# Patient Record
Sex: FEMALE | Race: WHITE | NOT HISPANIC OR LATINO | Employment: FULL TIME | URBAN - METROPOLITAN AREA
[De-identification: names, ages, dates, MRNs, and addresses within clinical notes are randomized per-mention and may not be internally consistent; named-entity substitution may affect disease eponyms.]

---

## 2021-09-27 ENCOUNTER — TELEPHONE (OUTPATIENT)
Dept: OBGYN CLINIC | Facility: CLINIC | Age: 56
End: 2021-09-27

## 2021-09-29 NOTE — TELEPHONE ENCOUNTER
Dwain Pierre returned call stating the prior insert medication did not seem to work very well and she was noticing vaginal discharge with odor  She would like to try something else  Maybe something by mouth  Clif Allison out of office today, will forward message  Patient understands appt may be indicated to discuss alternate therapy  Will await Edith's recommendation

## 2021-09-29 NOTE — TELEPHONE ENCOUNTER
Has not been seen since in this system  Which is  about  6 mo  Would like a repeat pelvic   Please have pt schedule

## 2021-09-29 NOTE — TELEPHONE ENCOUNTER
L/m on patient v/m to call back to schedule pelvic appt with Liat Tyler for eval per Edith's recommendation  6month since last visit

## 2021-10-07 ENCOUNTER — OFFICE VISIT (OUTPATIENT)
Dept: OBGYN CLINIC | Facility: CLINIC | Age: 56
End: 2021-10-07
Payer: COMMERCIAL

## 2021-10-07 ENCOUNTER — VBI (OUTPATIENT)
Dept: ADMINISTRATIVE | Facility: OTHER | Age: 56
End: 2021-10-07

## 2021-10-07 VITALS
BODY MASS INDEX: 20.61 KG/M2 | DIASTOLIC BLOOD PRESSURE: 68 MMHG | HEIGHT: 62 IN | WEIGHT: 112 LBS | SYSTOLIC BLOOD PRESSURE: 102 MMHG

## 2021-10-07 DIAGNOSIS — N95.2 POSTMENOPAUSE ATROPHIC VAGINITIS: ICD-10-CM

## 2021-10-07 DIAGNOSIS — N90.9 VULVAR DISORDER: ICD-10-CM

## 2021-10-07 DIAGNOSIS — D21.9 FIBROID: Primary | ICD-10-CM

## 2021-10-07 DIAGNOSIS — R10.2 VULVAR PAIN: ICD-10-CM

## 2021-10-07 DIAGNOSIS — N94.10 DYSPAREUNIA DUE TO NON-PSYCHOGENIC CAUSE IN FEMALE: ICD-10-CM

## 2021-10-07 PROCEDURE — 99213 OFFICE O/P EST LOW 20 MIN: CPT | Performed by: OBSTETRICS & GYNECOLOGY

## 2021-10-07 RX ORDER — CALCIUM CARBONATE 500(1250)
TABLET ORAL EVERY 12 HOURS
COMMUNITY

## 2021-10-07 RX ORDER — CLOBETASOL PROPIONATE 0.5 MG/G
CREAM TOPICAL
Qty: 60 G | Refills: 1 | Status: SHIPPED | OUTPATIENT
Start: 2021-10-07

## 2021-10-07 RX ORDER — OMEGA-3 FATTY ACIDS/FISH OIL 300-1000MG
CAPSULE ORAL AS NEEDED
COMMUNITY

## 2021-10-07 RX ORDER — GLUCOSAMINE SULFATE 500 MG
CAPSULE ORAL 3 TIMES DAILY
COMMUNITY
End: 2022-03-15

## 2021-10-07 RX ORDER — CLOBETASOL PROPIONATE 0.5 MG/G
CREAM TOPICAL 2 TIMES DAILY
Qty: 60 G | Refills: 1 | Status: SHIPPED | OUTPATIENT
Start: 2021-10-07 | End: 2021-10-07

## 2021-10-12 ENCOUNTER — TELEPHONE (OUTPATIENT)
Dept: OBGYN CLINIC | Facility: CLINIC | Age: 56
End: 2021-10-12

## 2021-10-18 ENCOUNTER — TELEPHONE (OUTPATIENT)
Dept: OBGYN CLINIC | Facility: CLINIC | Age: 56
End: 2021-10-18

## 2021-11-29 ENCOUNTER — OFFICE VISIT (OUTPATIENT)
Dept: OBGYN CLINIC | Facility: CLINIC | Age: 56
End: 2021-11-29
Payer: COMMERCIAL

## 2021-11-29 VITALS
HEIGHT: 62 IN | DIASTOLIC BLOOD PRESSURE: 70 MMHG | SYSTOLIC BLOOD PRESSURE: 110 MMHG | BODY MASS INDEX: 2.02 KG/M2 | WEIGHT: 11 LBS

## 2021-11-29 DIAGNOSIS — N95.2 POSTMENOPAUSE ATROPHIC VAGINITIS: Primary | ICD-10-CM

## 2021-11-29 DIAGNOSIS — N90.9 VULVAR DISORDER: ICD-10-CM

## 2021-11-29 DIAGNOSIS — N76.2 ATROPHIC VULVITIS: ICD-10-CM

## 2021-11-29 DIAGNOSIS — R10.2 VULVAR PAIN: ICD-10-CM

## 2021-11-29 DIAGNOSIS — N94.10 DYSPAREUNIA DUE TO NON-PSYCHOGENIC CAUSE IN FEMALE: ICD-10-CM

## 2021-11-29 PROCEDURE — 99213 OFFICE O/P EST LOW 20 MIN: CPT | Performed by: OBSTETRICS & GYNECOLOGY

## 2021-11-29 RX ORDER — ESCITALOPRAM OXALATE 5 MG/1
TABLET ORAL
COMMUNITY
Start: 2021-11-29 | End: 2022-03-15

## 2021-11-29 RX ORDER — ESTRADIOL 10 UG/1
INSERT VAGINAL
COMMUNITY
Start: 2021-11-23 | End: 2022-05-09

## 2022-01-03 ENCOUNTER — TELEPHONE (OUTPATIENT)
Dept: OBGYN CLINIC | Facility: CLINIC | Age: 57
End: 2022-01-03

## 2022-01-03 NOTE — TELEPHONE ENCOUNTER
Stacy leal/yarelis she has additional questions about lichen sclerosis  She has been researching and found there is an immuno suppressant ointment available and would like to ask popeye about this  In addition she would like her mammogam order  Per prior imaging due after April  Also due for well exam after 3/8/2022    Forwarded to popeye to review

## 2022-01-03 NOTE — TELEPHONE ENCOUNTER
Returned call to patient  Pt  researched and  found  another treatment  Not aware of other treatments  Can send information to   office  Will review  Mammogram due in 4/2022   Pt due for wellness in  3/2022 tos chedule

## 2022-01-12 ENCOUNTER — TELEPHONE (OUTPATIENT)
Dept: OBGYN CLINIC | Facility: CLINIC | Age: 57
End: 2022-01-12

## 2022-01-12 NOTE — TELEPHONE ENCOUNTER
Kathy leal/yarelis asking if the lichen sclerosus could be causing her neck to be red, dry and itchy  Return call to Aleena Boyce, advised lichen sclerosus affects the genital/anal area  Recommend contact PCP to evaluate redness/itchiness of skin at neck area  Patient asking about auto immune treatments other than what she is currently using  Reviewed previous phone encounter   She will further  discuss with popeye at next visit

## 2022-02-04 ENCOUNTER — TELEPHONE (OUTPATIENT)
Dept: OBGYN CLINIC | Facility: CLINIC | Age: 57
End: 2022-02-04

## 2022-02-04 NOTE — TELEPHONE ENCOUNTER
Pt called informing she has been diagnosed with lichen Sclerosus and has been researching alternative treatment recommendations  Pt is inquiring if Edith PRESSLEY has any information regarding treating LS with Tacrolimus oint  Pt is aware Edith PRESSLEY is currently out of the office, will forward to her to review  Pt became very emotional during conversation  Pt has scheduled a consult with nutritionist and a dermatologist for further treatment recommendations  Pt will address  treatment options with dermatology and  Tacrolimus oint use

## 2022-02-08 NOTE — TELEPHONE ENCOUNTER
Returned  Call to patient  She is using the steroid ointment    QOd now  Things are okay per pt   Questions  Answered  Fu 3/15

## 2022-03-01 ENCOUNTER — TELEPHONE (OUTPATIENT)
Dept: OBGYN CLINIC | Facility: CLINIC | Age: 57
End: 2022-03-01

## 2022-03-01 NOTE — TELEPHONE ENCOUNTER
Wilson called asking if she can increase clobetasol to every other day for flare up and how long she can use it that way  Also wondering if [facility] has heard of light therapy for treatment option?

## 2022-03-01 NOTE — TELEPHONE ENCOUNTER
No  to  light  therapy  Can check with a derm Can go to   twice a day for 2 weeks then   once a day  for  one  week  week  4 off

## 2022-03-15 ENCOUNTER — ANNUAL EXAM (OUTPATIENT)
Dept: OBGYN CLINIC | Facility: CLINIC | Age: 57
End: 2022-03-15
Payer: COMMERCIAL

## 2022-03-15 VITALS
BODY MASS INDEX: 20.69 KG/M2 | WEIGHT: 109.6 LBS | HEIGHT: 61 IN | SYSTOLIC BLOOD PRESSURE: 86 MMHG | DIASTOLIC BLOOD PRESSURE: 58 MMHG

## 2022-03-15 DIAGNOSIS — N90.9 VULVAR DISORDER: ICD-10-CM

## 2022-03-15 DIAGNOSIS — Z98.890 HISTORY OF LUMPECTOMY OF LEFT BREAST: Primary | ICD-10-CM

## 2022-03-15 DIAGNOSIS — F41.9 ANXIETY: ICD-10-CM

## 2022-03-15 DIAGNOSIS — Z01.411 ENCOUNTER FOR GYNECOLOGICAL EXAMINATION WITH ABNORMAL FINDING: ICD-10-CM

## 2022-03-15 DIAGNOSIS — N76.2 ATROPHIC VULVITIS: ICD-10-CM

## 2022-03-15 DIAGNOSIS — Z12.31 BREAST CANCER SCREENING BY MAMMOGRAM: ICD-10-CM

## 2022-03-15 DIAGNOSIS — D21.9 FIBROID: ICD-10-CM

## 2022-03-15 PROCEDURE — 99396 PREV VISIT EST AGE 40-64: CPT | Performed by: OBSTETRICS & GYNECOLOGY

## 2022-03-15 NOTE — PROGRESS NOTES
[address], [address], [facility], [address]    ASSESSMENT/PLAN: Arline Baumgarten is a 64 y o  Yamilet Sal who presents for annual gynecologic exam     Encounter for routine gynecologic examination  - Routine well woman exam completed today  - Cervical Cancer Screening: Current ASCCP Guidelines reviewed  Last Pap: 03/10/2021   Next Pap Due:   - COVID vaccine  Pfizer  X 3   Flu shot   Neg   - Contraceptive counseling discussed  Current contraception: none:   - Breast Cancer Screening: Last Mammogram 04/12/2021,   - Colorectal cancer screening  dw pt     - The following were reviewed in today's visit: breast self exam, mammography screening ordered, use and side effects of HRT, menopause, osteoporosis, adequate intake of calcium and vitamin D, exercise, healthy diet, DEXA ordered and colonoscopy discussed     Additional problems addressed during this visit:  1  History of lumpectomy of left breast  -     Mammo screening bilateral w 3d & cad; Future; Expected date: 03/14/2023    2  Breast cancer screening by mammogram  -     Mammo screening bilateral w 3d & cad; Future; Expected date: 03/14/2023    3  Vulvar disorder    4  Atrophic vulvitis    5  Fibroid    + vulvar pain and discomfort t  Feels like  knives into the perineum  Improvement w  Use  Of  Clobetasol  Using  Every other day  Once,  Aquaphor  Between  DW pt  Vulvar  Biopsy to r/o or support  Dx of  L/S  Option to see   [facility]    Who works  w  Dr Rajni Mejia given  Report any bleeding      CC: Annual Gynecologic Examination    HPI: Arline Baumgarten is a 64 y o  Yamilet Sal who presents for annual gynecologic examination  65 Yo here for   Wellness exam    + feels   Knife like  Pin in the  Vulvar area  + some  Decrease w use of   steroid  Ointment  No dc ,   Not sexually active  No vaginal odor    + wt loss  ,  Denies  Bleeding, bloating and satiety      The following portions of the patient's history were reviewed and updated as appropriate: She  has a past medical history of History of screening mammography (2021) and Papanicolaou smear (2021)  She  has a past surgical history that includes Mammo (historical) (2021); Cologuard (Historical);  section; Breast lumpectomy; and Breast biopsy (Left)  Her family history includes Dementia in her father; Leukemia in her daughter; Liver disease in her mother  She  reports that she has quit smoking  She has never used smokeless tobacco  She reports current alcohol use of about 5 0 - 8 0 standard drinks of alcohol per week  She reports that she does not use drugs  Current Outpatient Medications   Medication Sig Dispense Refill    Calcium 500 MG tablet Every 12 hours      clobetasol (TEMOVATE) 0 05 % cream PLEASE SEE ATTACHED FOR DETAILED DIRECTIONS 60 g 1    Ibuprofen (Advil) 200 MG CAPS as needed      Vagifem 10 MCG TABS vaginal tablet        No current facility-administered medications for this visit  She is allergic to penicillins       Review of Systems:  All systems normal except as noted in HPI          Objective:  BP (!) 86/58 (BP Location: Left arm, Patient Position: Sitting, Cuff Size: Standard)   Ht 5' 1 25" (1 556 m)   Wt 49 7 kg (109 lb 9 6 oz)   Breastfeeding No   BMI 20 54 kg/m²    Physical Exam  Vitals and nursing note reviewed  Constitutional:       Appearance: Normal appearance  HENT:      Head: Normocephalic  Cardiovascular:      Rate and Rhythm: Normal rate and regular rhythm  Pulses: Normal pulses  Heart sounds: Normal heart sounds  Pulmonary:      Effort: Pulmonary effort is normal       Breath sounds: Normal breath sounds  Chest:      Chest wall: No mass, lacerations, swelling, tenderness or edema  Breasts: Caden Score is 4   Breasts are symmetrical       Right: Normal  No swelling, bleeding, inverted nipple, mass, nipple discharge, skin change, tenderness, axillary adenopathy or supraclavicular adenopathy  Left: No swelling, bleeding, inverted nipple, mass, nipple discharge, skin change, tenderness, axillary adenopathy or supraclavicular adenopathy  Abdominal:      General: Abdomen is flat  Bowel sounds are normal       Palpations: Abdomen is soft  Hernia: There is no hernia in the left inguinal area or right inguinal area  Genitourinary:     General: Normal vulva  Exam position: Lithotomy position  Pubic Area: No rash or pubic lice  Caden stage (genital): 4       Labia:         Right: No rash, tenderness or lesion  Left: No rash, tenderness or lesion  Urethra: No prolapse, urethral pain, urethral swelling or urethral lesion  Vagina: Normal  No erythema  Cervix: Normal       Uterus: Normal        Adnexa: Right adnexa normal and left adnexa normal       Rectum: Normal           Comments: Vagina   +  Pink   No  Lesions     Musculoskeletal:         General: Normal range of motion  Cervical back: Neck supple  Lymphadenopathy:      Upper Body:      Right upper body: No supraclavicular, axillary or pectoral adenopathy  Left upper body: No supraclavicular, axillary or pectoral adenopathy  Lower Body: No right inguinal adenopathy  No left inguinal adenopathy  Skin:     General: Skin is warm and dry  Neurological:      General: No focal deficit present  Mental Status: She is alert and oriented to person, place, and time     Psychiatric:         Mood and Affect: Mood normal          Behavior: Behavior normal

## 2022-05-07 DIAGNOSIS — N95.2 POSTMENOPAUSAL ATROPHIC VAGINITIS: ICD-10-CM

## 2022-05-09 RX ORDER — ESTRADIOL 10 UG/1
INSERT VAGINAL
Qty: 24 TABLET | Refills: 4 | Status: SHIPPED | OUTPATIENT
Start: 2022-05-09

## 2022-10-28 ENCOUNTER — TELEPHONE (OUTPATIENT)
Dept: GASTROENTEROLOGY | Facility: CLINIC | Age: 57
End: 2022-10-28

## 2022-10-28 DIAGNOSIS — Z12.11 SCREENING FOR COLON CANCER: Primary | ICD-10-CM

## 2022-10-28 NOTE — TELEPHONE ENCOUNTER
10/28/22  Screened by: Charlie Nix    Referring Provider:Ashley Cullen     Pre- Screening: There is no height or weight on file to calculate BMI  Has patient been referred for a routine screening Colonoscopy? yes  Is the patient between 39-70 years old? yes      Previous Colonoscopy no   If yes:    Date:     Facility:     Reason:       SCHEDULING STAFF: If the patient is between 45yrs-49yrs, please advise patient to confirm benefits/coverage with their insurance company for a routine screening colonoscopy, some insurance carriers will only cover at [address] or older  If the patient is over 66years old, please schedule an office visit  Does the patient want to see a Gastroenterologist prior to their procedure OR are they having any GI symptoms? no    Has the patient been hospitalized or had abdominal surgery in the past 6 months? no    Does the patient use supplemental oxygen? no    Does the patient take Coumadin, Lovenox, Plavix, Elliquis, Xarelto, or other blood thinning medication? no    Has the patient had a stroke, cardiac event, or stent placed in the past year? no    SCHEDULING STAFF: If patient answers NO to above questions, then schedule procedure  If patient answers YES to above questions, then schedule office appointment  PT PASSED OA AND CAN BE REACHED AT [phone] [place]     If patient is between 45yrs - 49yrs, please advise patient that we will have to confirm benefits & coverage with their insurance company for a routine screening colonoscopy

## 2022-10-31 ENCOUNTER — PREP FOR PROCEDURE (OUTPATIENT)
Dept: GASTROENTEROLOGY | Facility: CLINIC | Age: 57
End: 2022-10-31

## 2022-10-31 DIAGNOSIS — Z12.11 SCREENING FOR COLON CANCER: Primary | ICD-10-CM

## 2022-10-31 NOTE — TELEPHONE ENCOUNTER
Scheduled date of colonoscopy (as of today): 12/12  Physician performing colonoscopy: Dr Arminda Simpson  [facility]  Bowel prep reviewed with patient: KAYLA BRUNSON [facility]  Instructions reviewed with patient by: Millie Benz  Clearances: none

## 2022-11-07 ENCOUNTER — TELEPHONE (OUTPATIENT)
Dept: GASTROENTEROLOGY | Facility: CLINIC | Age: 57
End: 2022-11-07

## 2022-11-07 NOTE — TELEPHONE ENCOUNTER
Patients GI provider:  Dr Marvin Herbert    Number to return call: ([phone])    Reason for call: Pt calling to reschedule her colonoscopy  Please call to reschedule  Thank you!     Scheduled procedure/appointment date if applicable: Apt/procedure 12/12/22

## 2022-12-27 DIAGNOSIS — Z12.11 SCREENING FOR COLON CANCER: ICD-10-CM

## 2022-12-29 RX ORDER — PEG-3350, SODIUM SULFATE, SODIUM CHLORIDE, POTASSIUM CHLORIDE, SODIUM ASCORBATE AND ASCORBIC ACID 7.5-2.691G
KIT ORAL
Refills: 0 | OUTPATIENT
Start: 2022-12-29

## 2023-01-03 ENCOUNTER — TELEPHONE (OUTPATIENT)
Dept: OTHER | Facility: OTHER | Age: 58
End: 2023-01-03

## 2023-01-03 NOTE — TELEPHONE ENCOUNTER
Patient called saying that she was told that she was going to get an e-mail with an attachment with the instruction for her colonoscopy prep, patient stated that she did the receive the e-mail but there was no attachment, patient is asking if the office can resent the attachment with the instruction or she can come in the office and  the instruction

## 2023-01-04 NOTE — TELEPHONE ENCOUNTER
Spoke to patient  A copy of Clenpiq prep instructions are available for  and also emailed to patient

## 2023-01-09 ENCOUNTER — ANESTHESIA EVENT (OUTPATIENT)
Dept: GASTROENTEROLOGY | Facility: AMBULATORY SURGERY CENTER | Age: 58
End: 2023-01-09

## 2023-01-09 ENCOUNTER — ANESTHESIA (OUTPATIENT)
Dept: GASTROENTEROLOGY | Facility: AMBULATORY SURGERY CENTER | Age: 58
End: 2023-01-09

## 2023-01-09 ENCOUNTER — HOSPITAL ENCOUNTER (OUTPATIENT)
Dept: GASTROENTEROLOGY | Facility: AMBULATORY SURGERY CENTER | Age: 58
Discharge: HOME | End: 2023-01-09
Attending: INTERNAL MEDICINE

## 2023-01-09 VITALS
BODY MASS INDEX: 20.58 KG/M2 | SYSTOLIC BLOOD PRESSURE: 105 MMHG | HEART RATE: 69 BPM | DIASTOLIC BLOOD PRESSURE: 68 MMHG | WEIGHT: 109 LBS | HEIGHT: 61 IN | TEMPERATURE: 97.7 F | RESPIRATION RATE: 22 BRPM | OXYGEN SATURATION: 97 %

## 2023-01-09 DIAGNOSIS — Z12.11 SCREENING FOR COLON CANCER: ICD-10-CM

## 2023-01-09 RX ORDER — SODIUM CHLORIDE, SODIUM LACTATE, POTASSIUM CHLORIDE, CALCIUM CHLORIDE 600; 310; 30; 20 MG/100ML; MG/100ML; MG/100ML; MG/100ML
50 INJECTION, SOLUTION INTRAVENOUS CONTINUOUS
Status: DISCONTINUED | OUTPATIENT
Start: 2023-01-09 | End: 2023-01-13 | Stop reason: HOSPADM

## 2023-01-09 RX ORDER — PROPOFOL 10 MG/ML
INJECTION, EMULSION INTRAVENOUS AS NEEDED
Status: DISCONTINUED | OUTPATIENT
Start: 2023-01-09 | End: 2023-01-09

## 2023-01-09 RX ADMIN — SODIUM CHLORIDE, SODIUM LACTATE, POTASSIUM CHLORIDE, CALCIUM CHLORIDE 50 ML/HR: 600; 310; 30; 20 INJECTION, SOLUTION INTRAVENOUS at 11:12

## 2023-01-09 RX ADMIN — PROPOFOL 50 MG: 10 INJECTION, EMULSION INTRAVENOUS at 11:34

## 2023-01-09 RX ADMIN — PROPOFOL 30 MG: 10 INJECTION, EMULSION INTRAVENOUS at 11:38

## 2023-01-09 RX ADMIN — PROPOFOL 50 MG: 10 INJECTION, EMULSION INTRAVENOUS at 11:27

## 2023-01-09 RX ADMIN — PROPOFOL 30 MG: 10 INJECTION, EMULSION INTRAVENOUS at 11:30

## 2023-01-09 RX ADMIN — PROPOFOL 70 MG: 10 INJECTION, EMULSION INTRAVENOUS at 11:25

## 2023-01-09 NOTE — ANESTHESIA POSTPROCEDURE EVALUATION
Post-Op Assessment Note    CV Status:  Stable       Mental Status:  Lethargic and sleepy   Hydration Status:  Stable   PONV Controlled:  None   Airway Patency:  Patent      Post Op Vitals Reviewed: Yes      Staff: Anesthesiologist, CRNA   Comments: vss        No notable events documented      BP   94/56   Temp      Pulse  66   Resp   14   SpO2   100

## 2023-01-09 NOTE — ANESTHESIA PREPROCEDURE EVALUATION
Procedure:  COLONOSCOPY    Relevant Problems   ANESTHESIA (within normal limits)      CARDIO (within normal limits)      PULMONARY (within normal limits)   (-) Sleep apnea   (-) Smoking (former)   (-) URI (upper respiratory infection)        Physical Exam    Airway    Mallampati score: I  TM Distance: >3 FB  Neck ROM: full     Dental   No notable dental hx     Cardiovascular      Pulmonary      Other Findings        Anesthesia Plan  ASA Score- 1     Anesthesia Type- IV sedation with anesthesia with ASA Monitors  Additional Monitors:   Airway Plan:           Plan Factors-Exercise tolerance (METS): >4 METS  Chart reviewed  Existing labs reviewed  Patient summary reviewed  Patient is not a current smoker  Induction- intravenous  Postoperative Plan-     Informed Consent- Anesthetic plan and risks discussed with patient  I personally reviewed this patient with the CRNA  Discussed and agreed on the Anesthesia Plan with the CRNA  Ling Magallon

## 2023-01-09 NOTE — H&P
History and Physical - [address] [facility]    Marla Syed 62 y o  female MRN: [id number]      HPI: Marla Syed is a 62y o  year old female who presents for colon cancer screening  Allergies   Allergen Reactions   • Penicillins Other (See Comments)     unknown         REVIEW OF SYSTEMS: Per the HPI, and otherwise unremarkable      Historical Information     Past Medical History:   Diagnosis Date   • History of screening mammography 2021    Bi-Rads 2    • Papanicolaou smear 2021     Past Surgical History:   Procedure Laterality Date   • BREAST BIOPSY Left    • BREAST LUMPECTOMY     •  SECTION      X2   • COLOGUARD (HISTORICAL)     • MAMMO (HISTORICAL)  2021    [facility]     Social History   Social History     Substance and Sexual Activity   Alcohol Use Yes   • Alcohol/week: 5 0 - 8 0 standard drinks   • Types: 5 - 8 Standard drinks or equivalent per week    Comment: wine nightly     Social History     Substance and Sexual Activity   Drug Use Never     Social History     Tobacco Use   Smoking Status Former   Smokeless Tobacco Never     Family History   Problem Relation Age of Onset   • Liver disease Mother    • Leukemia Daughter    • Dementia Father    • Breast cancer Neg Hx    • Uterine cancer Neg Hx    • Ovarian cancer Neg Hx    • Colon cancer Neg Hx        Meds/Allergies       Current Outpatient Medications:   •  Calcium 500 MG tablet  •  Ibuprofen 200 MG CAPS  •  sodium picosulfate, magnesium oxide, citric acid (Clenpiq) 10-3 5-12 MG-GM -GM/160ML SOLN  •  clobetasol (TEMOVATE) 0 05 % cream  •  polyethylene glycol (GOLYTELY) 4000 mL solution  •  Vagifem 10 MCG TABS vaginal tablet    Current Facility-Administered Medications:   •  lactated ringers infusion, 50 mL/hr, Intravenous, Continuous, 50 mL/hr at 23 1112        Objective     /53   Pulse 58   Temp 97 7 °F (36 5 °C) (Temporal)   Resp 16   Ht 5' 1 25" (1 556 m)   Wt 49 4 kg (109 lb)   SpO2 99% BMI 20 43 kg/m²       PHYSICAL EXAM    Gen: NAD AAOx3  Head: Normocephalic, Atraumatic  CV: S1S2 RRR no m/r/g  CHEST: Clear b/l no c/r/w  ABD: soft, +BS NT/ND no masses  EXT: no edema      ASSESSMENT/PLAN:  This is a 62y o  year old female here for colonoscopy, and she is stable and optimized for her procedure

## 2023-03-16 NOTE — PROGRESS NOTES
[address], [address], [facility] [address] [facility]    ASSESSMENT/PLAN: Yesi Berkowitz is a 62 y o  Gaviota Whitley who presents for annual gynecologic exam     Encounter for routine gynecologic examination  - Routine well woman exam completed today  - Cervical Cancer Screening: Current ASCCP Guidelines reviewed  Last Pap: 03/10/2021   Next Pap Due: 2024  - Breast Cancer Screening: Last Mammogram 04/14/2022, birads  1 50-75% d  - Colorectal cancer screening was not ordered  Done   2023   10 years    - The following were reviewed in today's visit: breast self exam, mammography screening ordered, STD testing, menopause, adequate intake of calcium and vitamin D, exercise, healthy diet and colonoscopy discussed     Additional problems addressed during this visit:  1  Encounter for gynecological examination with abnormal finding    2  Encounter for screening mammogram for malignant neoplasm of breast  -     Mammo screening bilateral w 3d & cad; Future    3  Postmenopausal atrophic vaginitis    4  Vulvar disorder  Comments:  pt seen by  Dr Amanda Ignacio   using   compounded  estrogen and  lidocaine  gel ,  Pt  and   vmagic ,  Hx of  vulvar inj    5  Anxiety    6  Fibroid    7  History of lumpectomy of left breast    8  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        CC:  Annual Gynecologic Examination    HPI: Yesi Berkowitz is a 62 y o  Gaviota Whitley who presents for annual gynecologic examination  63 yo her for wellness exam    Hx of  Vulvar  Pain  Seen  By  Dr Ke Shin w   Vestibulitis     No bleeding, bloating  And  Satiety  The following portions of the patient's history were reviewed and updated as appropriate: She  has a past medical history of History of screening mammography (04/12/2021), Papanicolaou smear (03/2021), Screening for colon cancer (01/09/2023), Vertigo (02/2023), and Vulvar pain    She  has a past surgical history that includes Mammo (historical) (03/2021); Cologuard (Historical);  section; Breast lumpectomy; and Breast biopsy (Left)  Her family history includes Dementia in her father; Leukemia in her daughter; Liver disease in her mother  She  reports that she has quit smoking  Her smoking use included cigarettes  She has never used smokeless tobacco  She reports current alcohol use of about 5 0 - 8 0 standard drinks per week  She reports that she does not use drugs  Current Outpatient Medications   Medication Sig Dispense Refill   • Calcium 500 MG tablet Every 12 hours     • diazepam (VALIUM) 2 mg tablet TAKE 1 TABLET BY MOUTH TWICE A DAY AS NEEDED FOR DIZZINESS OR VERTIGO     • Ibuprofen 200 MG CAPS as needed     • meloxicam (MOBIC) 15 mg tablet Take 15 mg by mouth daily     • ondansetron (ZOFRAN-ODT) 4 mg disintegrating tablet TAKE 1 TO 2 TABLETS BY MOUTH EVERY 8 HOURS AS NEEDED FOR NAUSEA       No current facility-administered medications for this visit  She is allergic to penicillins       Review of Systems:  All systems normal except as noted in HPI          Objective:  /64 (BP Location: Left arm, Patient Position: Sitting, Cuff Size: Standard)   Ht 5' 0 5" (1 537 m)   Wt 50 kg (110 lb 3 2 oz)   Breastfeeding No   BMI 21 17 kg/m²    Physical Exam  Vitals and nursing note reviewed  Constitutional:       Appearance: Normal appearance  HENT:      Head: Normocephalic  Cardiovascular:      Rate and Rhythm: Normal rate and regular rhythm  Pulses: Normal pulses  Pulmonary:      Effort: Pulmonary effort is normal       Breath sounds: Normal breath sounds  Chest:      Chest wall: No mass, lacerations, swelling, tenderness or edema  Breasts: Caden Score is 4  Breasts are symmetrical       Right: Normal  No swelling, bleeding, inverted nipple, mass, nipple discharge, skin change or tenderness  Left: No swelling, bleeding, inverted nipple, mass, nipple discharge, skin change or tenderness     Abdominal:      General: Abdomen is flat  Bowel sounds are normal       Palpations: Abdomen is soft  Genitourinary:     General: Normal vulva  Exam position: Lithotomy position  Pubic Area: No rash  Caden stage (genital): 4       Labia:         Right: No rash, tenderness or lesion  Left: No rash, tenderness or lesion  Urethra: No urethral pain, urethral swelling or urethral lesion  Vagina: Normal       Cervix: No cervical motion tenderness or discharge  Uterus: Normal        Adnexa: Right adnexa normal and left adnexa normal       Rectum: Normal    Musculoskeletal:         General: Normal range of motion  Cervical back: Neck supple  Lymphadenopathy:      Upper Body:      Right upper body: No supraclavicular, axillary or pectoral adenopathy  Left upper body: No supraclavicular, axillary or pectoral adenopathy  Lower Body: No right inguinal adenopathy  No left inguinal adenopathy  Skin:     General: Skin is warm and dry  Neurological:      General: No focal deficit present  Mental Status: She is alert and oriented to person, place, and time  Psychiatric:         Mood and Affect: Mood normal          Behavior: Behavior normal          Thought Content:  Thought content normal          Judgment: Judgment normal

## 2023-03-17 ENCOUNTER — ANNUAL EXAM (OUTPATIENT)
Dept: OBGYN CLINIC | Facility: CLINIC | Age: 58
End: 2023-03-17

## 2023-03-17 VITALS
SYSTOLIC BLOOD PRESSURE: 102 MMHG | HEIGHT: 61 IN | WEIGHT: 110.2 LBS | DIASTOLIC BLOOD PRESSURE: 64 MMHG | BODY MASS INDEX: 20.81 KG/M2

## 2023-03-17 DIAGNOSIS — Z98.890 HISTORY OF LUMPECTOMY OF LEFT BREAST: ICD-10-CM

## 2023-03-17 DIAGNOSIS — D21.9 FIBROID: ICD-10-CM

## 2023-03-17 DIAGNOSIS — N95.2 POSTMENOPAUSAL ATROPHIC VAGINITIS: ICD-10-CM

## 2023-03-17 DIAGNOSIS — N94.10 DYSPAREUNIA DUE TO NON-PSYCHOGENIC CAUSE IN FEMALE: ICD-10-CM

## 2023-03-17 DIAGNOSIS — Z01.411 ENCOUNTER FOR GYNECOLOGICAL EXAMINATION WITH ABNORMAL FINDING: Primary | ICD-10-CM

## 2023-03-17 DIAGNOSIS — F41.9 ANXIETY: ICD-10-CM

## 2023-03-17 DIAGNOSIS — N90.9 VULVAR DISORDER: ICD-10-CM

## 2023-03-17 DIAGNOSIS — Z12.31 ENCOUNTER FOR SCREENING MAMMOGRAM FOR MALIGNANT NEOPLASM OF BREAST: ICD-10-CM

## 2023-03-17 RX ORDER — MELOXICAM 15 MG/1
15 TABLET ORAL DAILY
COMMUNITY
Start: 2022-12-21

## 2023-03-17 RX ORDER — ONDANSETRON 4 MG/1
TABLET, ORALLY DISINTEGRATING ORAL
COMMUNITY
Start: 2023-02-17

## 2023-03-17 RX ORDER — DIAZEPAM 2 MG/1
TABLET ORAL
COMMUNITY
Start: 2023-02-14

## 2023-05-02 DIAGNOSIS — Z12.31 ENCOUNTER FOR SCREENING MAMMOGRAM FOR MALIGNANT NEOPLASM OF BREAST: ICD-10-CM

## 2024-05-31 PROBLEM — Z01.419 ENCOUNTER FOR GYNECOLOGICAL EXAMINATION WITHOUT ABNORMAL FINDING: Status: ACTIVE | Noted: 2024-05-31

## 2024-05-31 PROBLEM — Z12.4 SCREENING FOR CERVICAL CANCER: Status: ACTIVE | Noted: 2024-05-31

## 2024-05-31 NOTE — PROGRESS NOTES
[facility] - [address], [address], [place] [address]    ASSESSMENT/PLAN: Kathy Ceballos is a 58 y.o.  who presents for annual gynecologic exam.    Encounter for routine gynecologic examination  - Routine well woman exam completed today.  - Cervical Cancer Screening: Current ASCCP Guidelines reviewed. Last Pap: 03/10/2021 . Next Pap Due: today  - STI screening offered including HIV testing: Declined  -colonoscopy pt states up to date    - Breast Cancer Screening: Last Mammogram 2023,     Additional problems addressed during this visit:  1. Encounter for gynecological examination without abnormal finding  2. Screening for cervical cancer  -     Thinprep Tis Pap and HPV mRNA E6/E7 Reflex HPV 16,18/45  3. History of lumpectomy of left breast  4. Atrophic vulvitis  Comments:  Patricia Fisher. dilators  pelvic foor pt  and  estrogen cream .  Orders:  -     Thinprep Tis Pap and HPV mRNA E6/E7 Reflex HPV 16,18/45  -     clobetasol (TEMOVATE) 0.05 % ointment; Apply topically 3 (three) times a week  5. Postmenopausal atrophic vaginitis  -     Thinprep Tis Pap and HPV mRNA E6/E7 Reflex HPV 16,18/45  6. Fibroid  7. Encounter for screening mammogram for malignant neoplasm of breast  -     Mammo screening bilateral w 3d & cad; Future  8. Vulvar disorder  -     Thinprep Tis Pap and HPV mRNA E6/E7 Reflex HPV 16,18/45  -     clobetasol (TEMOVATE) 0.05 % ointment; Apply topically 3 (three) times a week    CC:  Annual Gynecologic Examination    HPI: Kathy Ceballos is a 58 y.o.  who presents for annual gynecologic examination.  59 yo  here for wellness exam.   No bleeding , bloating or satiety.  + patricia trivedi and feels   better.    Using temovate and  estrogen cream.    No bleeding , bloating changes in bowels   and  bladeder.   Hx  of  pelvic floor pt  and   use of dialtors     The following portions of the patient's history were reviewed and updated as appropriate: She  has a past medical  history of History of screening mammography (2023), Papanicolaou smear (2021), Screening for colon cancer (2023), Vertigo (2023), and Vulvar pain.  She  has a past surgical history that includes Mammo (historical) (2021); Cologuard (Historical);  section; Breast lumpectomy; and Breast biopsy (Left).  Her family history includes Dementia in her father; Leukemia in her daughter; Liver disease in her mother.  She  reports that she has quit smoking. Her smoking use included cigarettes. She has never been exposed to tobacco smoke. She has never used smokeless tobacco. She reports current alcohol use of about 5.0 - 8.0 standard drinks of alcohol per week. She reports that she does not use drugs.  Current Outpatient Medications   Medication Sig Dispense Refill   • Bioflavonoid Products (BIOFLEX PO) as directed Orally     • Calcium 500 MG tablet Every 12 hours     • clobetasol (TEMOVATE) 0.05 % ointment Apply topically 3 (three) times a week 60 g 1   • diazepam (VALIUM) 2 mg tablet TAKE 1 TABLET BY MOUTH TWICE A DAY AS NEEDED FOR DIZZINESS OR VERTIGO     • Ibuprofen 200 MG CAPS as needed     • meloxicam (MOBIC) 15 mg tablet Take 15 mg by mouth daily     • Miebo 1.338 GM/ML SOLN      • ondansetron (ZOFRAN-ODT) 4 mg disintegrating tablet TAKE 1 TO 2 TABLETS BY MOUTH EVERY 8 HOURS AS NEEDED FOR NAUSEA       No current facility-administered medications for this visit.     She is allergic to penicillins..    Review of Systems   Constitutional:  Negative for chills and fever.   HENT:  Negative for ear pain and sore throat.    Eyes:  Negative for pain and visual disturbance.   Respiratory:  Negative for cough and shortness of breath.    Cardiovascular:  Negative for chest pain and palpitations.   Gastrointestinal:  Negative for abdominal pain and vomiting.   Endocrine: Negative.    Genitourinary: Negative.  Negative for dysuria and hematuria.   Musculoskeletal:  Negative for arthralgias and back pain.    Skin:  Negative for color change and rash.   Allergic/Immunologic: Negative.    Neurological: Negative.  Negative for seizures and syncope.   Hematological: Negative.    Psychiatric/Behavioral: Negative.     All other systems reviewed and are negative.        Objective:  /62 (BP Location: Left arm, Patient Position: Sitting, Cuff Size: Standard)   Ht 5' (1.524 m)   Wt 49.9 kg (110 lb)   BMI 21.48 kg/m²    Physical Exam  Vitals and nursing note reviewed.   Constitutional:       Appearance: Normal appearance.   HENT:      Head: Normocephalic.   Cardiovascular:      Rate and Rhythm: Normal rate and regular rhythm.      Pulses: Normal pulses.      Heart sounds: Normal heart sounds.   Pulmonary:      Effort: Pulmonary effort is normal.      Breath sounds: Normal breath sounds.   Chest:      Chest wall: No mass, lacerations, swelling, tenderness or edema.   Breasts:     Caden Score is 4.      Breasts are symmetrical.      Right: Normal. No swelling, bleeding, inverted nipple, mass, nipple discharge, skin change or tenderness.      Left: No swelling, bleeding, inverted nipple, mass, nipple discharge, skin change or tenderness.   Abdominal:      General: Abdomen is flat. Bowel sounds are normal.      Palpations: Abdomen is soft.   Genitourinary:     General: Normal vulva.      Exam position: Lithotomy position.      Pubic Area: No rash.       Caden stage (genital): 4.      Labia:         Right: No rash, tenderness or lesion.         Left: No rash, tenderness or lesion.       Urethra: No urethral pain, urethral swelling or urethral lesion.      Vagina: Normal. No tenderness.      Cervix: No cervical motion tenderness or discharge.      Uterus: Normal. Enlarged.       Adnexa: Right adnexa normal and left adnexa normal.      Rectum: Normal.      Comments: Atrophy noted noted   levators  3   Musculoskeletal:         General: Normal range of motion.      Cervical back: Neck supple.   Lymphadenopathy:      Upper  Body:      Right upper body: No supraclavicular, axillary or pectoral adenopathy.      Left upper body: No supraclavicular, axillary or pectoral adenopathy.      Lower Body: No right inguinal adenopathy. No left inguinal adenopathy.   Skin:     General: Skin is warm and dry.   Neurological:      General: No focal deficit present.      Mental Status: She is alert and oriented to person, place, and time.   Psychiatric:         Mood and Affect: Mood normal.         Behavior: Behavior normal.         Thought Content: Thought content normal.         Judgment: Judgment normal.

## 2024-06-03 ENCOUNTER — ANNUAL EXAM (OUTPATIENT)
Dept: OBGYN CLINIC | Facility: CLINIC | Age: 59
End: 2024-06-03
Payer: COMMERCIAL

## 2024-06-03 VITALS
DIASTOLIC BLOOD PRESSURE: 62 MMHG | WEIGHT: 110 LBS | SYSTOLIC BLOOD PRESSURE: 102 MMHG | HEIGHT: 60 IN | BODY MASS INDEX: 21.6 KG/M2

## 2024-06-03 DIAGNOSIS — Z12.31 ENCOUNTER FOR SCREENING MAMMOGRAM FOR MALIGNANT NEOPLASM OF BREAST: ICD-10-CM

## 2024-06-03 DIAGNOSIS — Z01.419 ENCOUNTER FOR GYNECOLOGICAL EXAMINATION WITHOUT ABNORMAL FINDING: Primary | ICD-10-CM

## 2024-06-03 DIAGNOSIS — Z98.890 HISTORY OF LUMPECTOMY OF LEFT BREAST: ICD-10-CM

## 2024-06-03 DIAGNOSIS — N76.2 ATROPHIC VULVITIS: ICD-10-CM

## 2024-06-03 DIAGNOSIS — Z12.4 SCREENING FOR CERVICAL CANCER: ICD-10-CM

## 2024-06-03 DIAGNOSIS — N95.2 POSTMENOPAUSAL ATROPHIC VAGINITIS: ICD-10-CM

## 2024-06-03 DIAGNOSIS — N90.9 VULVAR DISORDER: ICD-10-CM

## 2024-06-03 DIAGNOSIS — D21.9 FIBROID: ICD-10-CM

## 2024-06-03 PROCEDURE — 99396 PREV VISIT EST AGE 40-64: CPT | Performed by: OBSTETRICS & GYNECOLOGY

## 2024-06-03 RX ORDER — PERFLUOROHEXYLOCTANE 1 MG/MG
SOLUTION OPHTHALMIC
COMMUNITY
Start: 2024-05-30

## 2024-06-03 RX ORDER — CLOBETASOL PROPIONATE 0.5 MG/G
OINTMENT TOPICAL
COMMUNITY
Start: 2024-04-02 | End: 2024-06-03 | Stop reason: SDUPTHER

## 2024-06-03 RX ORDER — CLOBETASOL PROPIONATE 0.5 MG/G
OINTMENT TOPICAL 3 TIMES WEEKLY
Qty: 60 G | Refills: 1 | Status: SHIPPED | OUTPATIENT
Start: 2024-06-03 | End: 2025-06-03

## 2024-06-06 LAB
CLINICAL INFO: NORMAL
CYTO CVX: NORMAL
DATE PREVIOUS BX: NORMAL
HPV E6+E7 MRNA CVX QL NAA+PROBE: NOT DETECTED
LMP START DATE: NORMAL
SL AMB PREV. PAP:: NORMAL
SPECIMEN SOURCE CVX/VAG CYTO: NORMAL
STAT OF ADQ CVX/VAG CYTO-IMP: NORMAL

## 2024-06-30 PROBLEM — Z12.4 SCREENING FOR CERVICAL CANCER: Status: RESOLVED | Noted: 2024-05-31 | Resolved: 2024-06-30

## 2024-06-30 PROBLEM — Z01.419 ENCOUNTER FOR GYNECOLOGICAL EXAMINATION WITHOUT ABNORMAL FINDING: Status: RESOLVED | Noted: 2024-05-31 | Resolved: 2024-06-30

## 2024-07-03 ENCOUNTER — TELEPHONE (OUTPATIENT)
Age: 59
End: 2024-07-03

## 2024-07-03 NOTE — TELEPHONE ENCOUNTER
Pt requested order for mammogram to be sent to [facility]. Order faxed to # [phone].

## 2024-07-31 DIAGNOSIS — Z12.31 ENCOUNTER FOR SCREENING MAMMOGRAM FOR MALIGNANT NEOPLASM OF BREAST: ICD-10-CM

## 2025-06-08 NOTE — PROGRESS NOTES
[facility] - [address], [address], [place] [address]    ASSESSMENT/PLAN: Kathy Ceballos is a 60 y.o.  who presents for annual gynecologic exam.    Encounter for routine gynecologic examination  - Routine well woman exam completed today.  - Cervical Cancer Screening: Current ASCCP Guidelines reviewed. Last Pap: 2024  Next Pap Due:   - Contraceptive counseling discussed.  Current contraception: none or condoms:   - Breast Cancer Screening: Last Mammogram 2024,   - Colorectal cancer screening was not ordered. 2023  - The following were reviewed in today's visit: breast self exam mammogram  bone  health  menopause   symptoms       Additional problems addressed during this visit:  1. Encounter for gynecological examination without abnormal finding  2. Encounter for screening mammogram for malignant neoplasm of breast  -     Mammo screening bilateral w 3d and cad; Future  3. Postmenopausal  -     US breast screening bilateral complete (ABUS); Future  4. Atrophic vulvitis  5. Vulvar disorder  6. History of lumpectomy of left breast  7. 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  8. Breast density  -     US breast screening bilateral complete (ABUS); Future      CC:  Annual Gynecologic Examination    HPI: Kathy Ceballos is a 60 y.o.  who presents for annual gynecologic examination.  HPI    The following portions of the patient's history were reviewed and updated as appropriate: She  has a past medical history of History of screening mammography (2024), Papanicolaou smear (2024), Screening for colon cancer (2023), Vertigo (2023), and Vulvar pain.  She  has a past surgical history that includes Mammo (historical) (2024); Cologuard (Historical);  section; Breast lumpectomy; and Breast biopsy (Left).  Her family history includes Dementia in her father; Leukemia in her daughter; Liver disease in her mother.  She  reports that she  has quit smoking. Her smoking use included cigarettes. She has never been exposed to tobacco smoke. She has never used smokeless tobacco. She reports current alcohol use of about 5.0 - 8.0 standard drinks of alcohol per week. She reports that she does not use drugs.  Current Outpatient Medications   Medication Sig Dispense Refill   • Bioflavonoid Products (BIOFLEX PO)      • Calcium 500 MG tablet in the morning and in the evening.     • clobetasol (TEMOVATE) 0.05 % ointment Apply topically 3 (three) times a week 60 g 1   • estradiol (ESTRACE) 0.1 mg/g vaginal cream Insert 2 g into the vagina daily     • Ibuprofen 200 MG CAPS as needed     • loteprednol etabonate (LOTEMAX) 0.5 % ophthalmic suspension Administer 1 drop to both eyes     • ondansetron (ZOFRAN-ODT) 4 mg disintegrating tablet      • diazepam (VALIUM) 2 mg tablet  (Patient not taking: Reported on 6/9/2025)     • meloxicam (MOBIC) 15 mg tablet Take 15 mg by mouth daily (Patient not taking: Reported on 6/9/2025)     • Miebo 1.338 GM/ML SOLN  (Patient not taking: Reported on 6/9/2025)       No current facility-administered medications for this visit.     She is allergic to penicillins..    Review of Systems:  All systems normal except as noted in HPI          Objective:  /60 (BP Location: Left arm, Patient Position: Sitting, Cuff Size: Standard)   Ht 5' (1.524 m)   Wt 49 kg (108 lb)   BMI 21.09 kg/m²    Physical Exam  Vitals and nursing note reviewed.   Constitutional:       Appearance: Normal appearance.   HENT:      Head: Normocephalic.     Cardiovascular:      Rate and Rhythm: Normal rate and regular rhythm.      Pulses: Normal pulses.      Heart sounds: Normal heart sounds.   Pulmonary:      Effort: Pulmonary effort is normal.      Breath sounds: Normal breath sounds.   Chest:      Chest wall: No mass, lacerations, swelling, tenderness or edema.   Breasts:     Caden Score is 4.      Breasts are symmetrical.      Right: Normal. No swelling,  bleeding, inverted nipple, mass, nipple discharge, skin change or tenderness.      Left: No swelling, bleeding, inverted nipple, mass, nipple discharge, skin change or tenderness.      Comments: 1 small  pink  center core   4 mm sebaceous cyst     Abdominal:      General: Abdomen is flat. Bowel sounds are normal.      Palpations: Abdomen is soft.   Genitourinary:     General: Normal vulva.      Exam position: Lithotomy position.      Pubic Area: No rash.       Caden stage (genital): 4.      Labia:         Right: No rash, tenderness or lesion.         Left: No rash, tenderness or lesion.       Urethra: No urethral pain, urethral swelling or urethral lesion.      Vagina: Normal.      Cervix: No cervical motion tenderness or discharge.      Uterus: Normal.       Adnexa: Right adnexa normal and left adnexa normal.      Rectum: Normal.        Comments: 1 atrophic     2  pale pink   b/l       Musculoskeletal:         General: Normal range of motion.      Cervical back: Neck supple.   Lymphadenopathy:      Upper Body:      Right upper body: No supraclavicular, axillary or pectoral adenopathy.      Left upper body: No supraclavicular, axillary or pectoral adenopathy.      Lower Body: No right inguinal adenopathy. No left inguinal adenopathy.     Skin:     General: Skin is warm and dry.     Neurological:      General: No focal deficit present.      Mental Status: She is alert and oriented to person, place, and time.     Psychiatric:         Mood and Affect: Mood normal.         Behavior: Behavior normal.         Thought Content: Thought content normal.         Judgment: Judgment normal.

## 2025-06-09 ENCOUNTER — ANNUAL EXAM (OUTPATIENT)
Dept: OBGYN CLINIC | Facility: CLINIC | Age: 60
End: 2025-06-09
Payer: COMMERCIAL

## 2025-06-09 VITALS
DIASTOLIC BLOOD PRESSURE: 60 MMHG | SYSTOLIC BLOOD PRESSURE: 106 MMHG | BODY MASS INDEX: 21.2 KG/M2 | WEIGHT: 108 LBS | HEIGHT: 60 IN

## 2025-06-09 DIAGNOSIS — Z12.31 ENCOUNTER FOR SCREENING MAMMOGRAM FOR MALIGNANT NEOPLASM OF BREAST: ICD-10-CM

## 2025-06-09 DIAGNOSIS — N90.9 VULVAR DISORDER: ICD-10-CM

## 2025-06-09 DIAGNOSIS — Z01.419 ENCOUNTER FOR GYNECOLOGICAL EXAMINATION WITHOUT ABNORMAL FINDING: Primary | ICD-10-CM

## 2025-06-09 DIAGNOSIS — N76.2 ATROPHIC VULVITIS: ICD-10-CM

## 2025-06-09 DIAGNOSIS — R92.30 BREAST DENSITY: ICD-10-CM

## 2025-06-09 DIAGNOSIS — N94.10 DYSPAREUNIA DUE TO NON-PSYCHOGENIC CAUSE IN FEMALE: ICD-10-CM

## 2025-06-09 DIAGNOSIS — Z78.0 POSTMENOPAUSAL: ICD-10-CM

## 2025-06-09 DIAGNOSIS — Z98.890 HISTORY OF LUMPECTOMY OF LEFT BREAST: ICD-10-CM

## 2025-06-09 PROCEDURE — 99396 PREV VISIT EST AGE 40-64: CPT | Performed by: OBSTETRICS & GYNECOLOGY

## 2025-06-09 RX ORDER — LOTEPREDNOL ETABONATE 5 MG/ML
1 SUSPENSION/ DROPS OPHTHALMIC
COMMUNITY
Start: 2025-05-19

## 2025-06-09 RX ORDER — ESTRADIOL 0.1 MG/G
2 CREAM VAGINAL DAILY
COMMUNITY

## 2025-07-09 PROBLEM — Z01.419 ENCOUNTER FOR GYNECOLOGICAL EXAMINATION WITHOUT ABNORMAL FINDING: Status: RESOLVED | Noted: 2025-06-09 | Resolved: 2025-07-09

## 2025-08-05 ENCOUNTER — OFFICE VISIT (OUTPATIENT)
Age: 60
End: 2025-08-05
Payer: COMMERCIAL

## 2025-08-05 VITALS — HEIGHT: 61 IN | BODY MASS INDEX: 20.32 KG/M2 | WEIGHT: 107.6 LBS

## 2025-08-05 DIAGNOSIS — M18.11 ARTHRITIS OF CARPOMETACARPAL (CMC) JOINT OF RIGHT THUMB: Primary | ICD-10-CM

## 2025-08-05 PROCEDURE — 99213 OFFICE O/P EST LOW 20 MIN: CPT | Performed by: ORTHOPAEDIC SURGERY

## 2025-08-05 PROCEDURE — 20600 DRAIN/INJ JOINT/BURSA W/O US: CPT | Performed by: ORTHOPAEDIC SURGERY

## 2025-08-05 RX ORDER — TRIAMCINOLONE ACETONIDE 40 MG/ML
20 INJECTION, SUSPENSION INTRA-ARTICULAR; INTRAMUSCULAR
Status: COMPLETED | OUTPATIENT
Start: 2025-08-05 | End: 2025-08-05

## 2025-08-05 RX ORDER — LIDOCAINE HYDROCHLORIDE 10 MG/ML
0.5 INJECTION, SOLUTION INFILTRATION; PERINEURAL
Status: COMPLETED | OUTPATIENT
Start: 2025-08-05 | End: 2025-08-05

## 2025-08-05 RX ADMIN — LIDOCAINE HYDROCHLORIDE 0.5 ML: 10 INJECTION, SOLUTION INFILTRATION; PERINEURAL at 17:00

## 2025-08-05 RX ADMIN — TRIAMCINOLONE ACETONIDE 20 MG: 40 INJECTION, SUSPENSION INTRA-ARTICULAR; INTRAMUSCULAR at 17:00

## 2025-08-22 ENCOUNTER — HOSPITAL ENCOUNTER (OUTPATIENT)
Age: 60
Discharge: HOME | End: 2025-08-22
Attending: OBSTETRICS & GYNECOLOGY
Payer: COMMERCIAL

## 2025-08-22 VITALS — BODY MASS INDEX: 20.77 KG/M2 | HEIGHT: 61 IN | WEIGHT: 110 LBS

## 2025-08-22 VITALS — HEIGHT: 61 IN | BODY MASS INDEX: 20.77 KG/M2 | WEIGHT: 110 LBS

## 2025-08-22 DIAGNOSIS — Z12.31 ENCOUNTER FOR SCREENING MAMMOGRAM FOR MALIGNANT NEOPLASM OF BREAST: ICD-10-CM

## 2025-08-22 DIAGNOSIS — Z78.0 POSTMENOPAUSAL: ICD-10-CM

## 2025-08-22 DIAGNOSIS — R92.30 BREAST DENSITY: ICD-10-CM

## 2025-08-22 PROCEDURE — 76641 ULTRASOUND BREAST COMPLETE: CPT

## 2025-08-22 PROCEDURE — 77063 BREAST TOMOSYNTHESIS BI: CPT

## 2025-08-22 PROCEDURE — 77067 SCR MAMMO BI INCL CAD: CPT
